# Patient Record
Sex: FEMALE | Race: WHITE | HISPANIC OR LATINO | Employment: STUDENT | URBAN - METROPOLITAN AREA
[De-identification: names, ages, dates, MRNs, and addresses within clinical notes are randomized per-mention and may not be internally consistent; named-entity substitution may affect disease eponyms.]

---

## 2023-08-14 PROBLEM — J30.9 ALLERGIC RHINITIS: Status: ACTIVE | Noted: 2023-08-14

## 2023-08-14 PROBLEM — L70.9 ACNE: Status: ACTIVE | Noted: 2023-08-14

## 2023-08-14 PROBLEM — R63.5 ABNORMAL WEIGHT GAIN: Status: ACTIVE | Noted: 2023-08-14

## 2023-08-14 PROBLEM — J45.909 ASTHMA (HHS-HCC): Status: ACTIVE | Noted: 2023-08-14

## 2023-08-14 RX ORDER — TRETINOIN 0.25 MG/G
GEL TOPICAL
COMMUNITY
Start: 2017-08-17 | End: 2023-08-15 | Stop reason: SDUPTHER

## 2023-08-14 RX ORDER — ALBUTEROL SULFATE 90 UG/1
AEROSOL, METERED RESPIRATORY (INHALATION)
COMMUNITY
Start: 2013-07-23 | End: 2023-08-15 | Stop reason: SDUPTHER

## 2023-08-15 ENCOUNTER — OFFICE VISIT (OUTPATIENT)
Dept: PEDIATRICS | Facility: CLINIC | Age: 18
End: 2023-08-15
Payer: COMMERCIAL

## 2023-08-15 VITALS — BODY MASS INDEX: 32.85 KG/M2 | WEIGHT: 169.6 LBS

## 2023-08-15 DIAGNOSIS — M67.40 GANGLION CYST: Primary | ICD-10-CM

## 2023-08-15 DIAGNOSIS — J45.909 UNCOMPLICATED ASTHMA, UNSPECIFIED ASTHMA SEVERITY, UNSPECIFIED WHETHER PERSISTENT (HHS-HCC): ICD-10-CM

## 2023-08-15 DIAGNOSIS — L70.9 ACNE, UNSPECIFIED ACNE TYPE: ICD-10-CM

## 2023-08-15 PROBLEM — M62.82 EXERTIONAL RHABDOMYOLYSIS: Status: RESOLVED | Noted: 2023-02-07 | Resolved: 2023-08-15

## 2023-08-15 PROCEDURE — 99213 OFFICE O/P EST LOW 20 MIN: CPT | Performed by: NURSE PRACTITIONER

## 2023-08-15 RX ORDER — TRETINOIN 0.25 MG/G
GEL TOPICAL
Qty: 45 G | Refills: 3 | Status: SHIPPED | OUTPATIENT
Start: 2023-08-15

## 2023-08-15 RX ORDER — ALBUTEROL SULFATE 90 UG/1
2 AEROSOL, METERED RESPIRATORY (INHALATION) EVERY 4 HOURS PRN
Qty: 18 G | Refills: 3 | Status: SHIPPED | OUTPATIENT
Start: 2023-08-15

## 2023-08-15 NOTE — PROGRESS NOTES
Subjective   Izzy Barnett is a 18 y.o. female who presents for Cyst (Both wrists for over a month, very painful).      Here today for evaluation of bumps on both hands   Bilateral- dorsal side of hands with small bump   Over a month - worsening   Hurts when moving hands   Nothing tried at home          Review of Systems  A ROS was completed and all systems are negative with the exception of what is noted in HPI.     Objective   Wt 76.9 kg (169 lb 9.6 oz)   BMI 32.85 kg/m²   Growth percentiles: Facility age limit for growth %jairo is 20 years. Facility age limit for growth %jairo is 20 years.     Physical Exam  Musculoskeletal:        Hands:       Comments: Small firm mass          Assessment/Plan   Problem List Items Addressed This Visit       Acne    Relevant Medications    tretinoin (Retin-A) 0.025 % gel    Asthma    Relevant Medications    albuterol 90 mcg/actuation inhaler     Other Visit Diagnoses       Ganglion cyst    -  Primary    Relevant Orders    Referral to General Surgery          Educated on ganglion cysts. Benign condition, but if bothersome should follow up with gen surgery. Moving back to college tomorrow. Can either follow up there or monitor til winter break   Sent refills of medication         Quita Green, APRN-CNP

## 2023-11-21 PROBLEM — R10.10 PAIN OF UPPER ABDOMEN: Status: RESOLVED | Noted: 2017-04-26 | Resolved: 2023-11-21

## 2023-11-21 PROBLEM — K58.2 IRRITABLE BOWEL SYNDROME WITH BOTH CONSTIPATION AND DIARRHEA: Status: ACTIVE | Noted: 2017-04-26

## 2023-11-21 PROBLEM — R10.13 DYSPEPSIA: Status: ACTIVE | Noted: 2017-04-26

## 2023-11-21 RX ORDER — KETOCONAZOLE 20 MG/ML
SHAMPOO, SUSPENSION TOPICAL 2 TIMES WEEKLY
COMMUNITY
Start: 2019-01-07

## 2023-11-21 RX ORDER — HYDROCORTISONE 25 MG/G
OINTMENT TOPICAL 2 TIMES DAILY
COMMUNITY
Start: 2019-04-25

## 2023-11-22 ENCOUNTER — APPOINTMENT (OUTPATIENT)
Dept: PEDIATRICS | Facility: CLINIC | Age: 18
End: 2023-11-22
Payer: COMMERCIAL